# Patient Record
(demographics unavailable — no encounter records)

---

## 2024-11-01 NOTE — PLAN
[FreeTextEntry1] : 60-year-old gentleman presents for evaluation and medication reconciliation Morbid obesity-BMI 41 The importance of diet and exercise for the purpose of weight management are discussed he is poorly compliant in this category.  He is aware of the risk of metabolic syndrome.  He is on lisinopril 40 mg daily Bipolar disorder-Risperdal 3 mg at bedtime Topamax 100 mg daily/Cymbalta Lumbar disc disease-oxycodone/prednisone as needed/meloxicam as needed Gabapentin

## 2024-11-01 NOTE — HEALTH RISK ASSESSMENT
[0] : 2) Feeling down, depressed, or hopeless: Not at all (0) [PHQ-2 Negative - No further assessment needed] : PHQ-2 Negative - No further assessment needed [[id number]] : 0

## 2024-11-01 NOTE — HISTORY OF PRESENT ILLNESS
[Spouse] : spouse [FreeTextEntry1] : Medication renewal [de-identified] : Bipolar disorder Obesity/hypertension Lumbar spondylosis

## 2024-11-01 NOTE — HISTORY OF PRESENT ILLNESS
[Spouse] : spouse [FreeTextEntry1] : Medication renewal [de-identified] : Bipolar disorder Obesity/hypertension Lumbar spondylosis

## 2024-11-01 NOTE — HEALTH RISK ASSESSMENT
[0] : 2) Feeling down, depressed, or hopeless: Not at all (0) [PHQ-2 Negative - No further assessment needed] : PHQ-2 Negative - No further assessment needed [[id number]] : 0

## 2025-04-21 NOTE — PLAN
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